# Patient Record
Sex: FEMALE | Race: WHITE | NOT HISPANIC OR LATINO | Employment: STUDENT | URBAN - METROPOLITAN AREA
[De-identification: names, ages, dates, MRNs, and addresses within clinical notes are randomized per-mention and may not be internally consistent; named-entity substitution may affect disease eponyms.]

---
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
## 2023-01-13 ENCOUNTER — OFFICE VISIT (OUTPATIENT)
Dept: CARDIOLOGY | Facility: CLINIC | Age: 18
End: 2023-01-13
Payer: COMMERCIAL

## 2023-01-13 VITALS
WEIGHT: 140 LBS | SYSTOLIC BLOOD PRESSURE: 110 MMHG | DIASTOLIC BLOOD PRESSURE: 86 MMHG | BODY MASS INDEX: 20.04 KG/M2 | HEART RATE: 62 BPM | HEIGHT: 70 IN

## 2023-01-13 DIAGNOSIS — R00.2 PALPITATIONS: Primary | ICD-10-CM

## 2023-01-13 PROCEDURE — 93000 ELECTROCARDIOGRAM COMPLETE: CPT | Performed by: NURSE PRACTITIONER

## 2023-01-13 PROCEDURE — 99214 OFFICE O/P EST MOD 30 MIN: CPT | Performed by: NURSE PRACTITIONER

## 2023-01-13 RX ORDER — RIBOFLAVIN (VITAMIN B2) 100 MG
TABLET ORAL
COMMUNITY

## 2023-01-13 NOTE — PROGRESS NOTES
Subjective:     Encounter Date:01/13/2023      Patient ID: Caitlyn Coyle is a 17 y.o. female.    Chief Complaint:follow up palpitations  History of Present Illness  This is a 18 y/o female who has been evaluated by Dr. Tapia in the past and is new to me today. She has a pmhx of palpitations and was initially seen in the office in August 2021. At the time, the palpitations were not occurring frequently enough to warrant placing an outpatient monitor. She was encouraged to refrain from energy drinks, pre-workout supplements and stop taking Aldactone which was prescribed for acne. She then saw Vero Huertas in January 2022 in follow up. Per office note, she was still having palpitations every now and then. No changes were made and she was instructed to follow up in 1 year.    She is here today for a follow up visit. Her palpitations are occurring more frequently. They occur at least once a week. They can last 30 minutes to an hour or two.  Per her Apple Watch, she has episodes of what looks like sinus tachycardia with rates in 150s-160s. She becomes dizzy when her heart rate starts to go back down. She does work out but doesn't do cardio such as running. She typically lifts weights. She does play [employer] and flag football. Last week while playing [employer], her Works out but doesn't do a lot of cardio. Last week playing racquetball, her heart rate spiked and remained elevated for about an hour. She says the other day she bent over and when she came back up, her heart rate went up and she had a little SOA with it. She denies any chest pain or syncope. She denies any tingling in her lips or fingertips when her heart is racing. She denies anxiety. She has pretty much cut out all caffeine. She recently saw her PCP who ordered lab work which included a normal CMP, Vit B12, Mag level, thyroid panel and CBC. PCP also ordered autoimmune testing which was reportedly normal as well.    I have reviewed and updated  as appropriate allergies, current medications, past family history, past medical history, past surgical history and problem list.    Review of Systems   Constitutional: Negative for fever, malaise/fatigue, weight gain and weight loss.   HENT: Negative for congestion, hoarse voice and sore throat.    Eyes: Negative for blurred vision and double vision.   Cardiovascular: Positive for palpitations. Negative for chest pain, dyspnea on exertion, leg swelling, orthopnea and syncope.   Respiratory: Negative for cough, shortness of breath and wheezing.    Gastrointestinal: Negative for abdominal pain, hematemesis, hematochezia and melena.   Genitourinary: Negative for dysuria and hematuria.   Neurological: Positive for dizziness. Negative for headaches, light-headedness and numbness.   Psychiatric/Behavioral: Negative for depression. The patient is not nervous/anxious.          Current Outpatient Medications:   •  ascorbic acid (VITAMIN C) 100 MG tablet, Take  by mouth., Disp: , Rfl:   •  cholecalciferol (VITAMIN D3) 25 MCG (1000 UT) tablet, Take 1,000 Units by mouth Daily., Disp: , Rfl:   •  Omega-3 Fatty Acids (fish oil) 1000 MG capsule capsule, Take  by mouth., Disp: , Rfl:   •  Zinc Sulfate 66 MG tablet, Take  by mouth., Disp: , Rfl:   •  montelukast (SINGULAIR) 10 MG tablet, Take 10 mg by mouth Daily., Disp: , Rfl:     Past Medical History:   Diagnosis Date   • Acne    • Palpitations        Past Surgical History:   Procedure Laterality Date   • WISDOM TOOTH EXTRACTION         No family history on file.    Social History     Tobacco Use   • Smoking status: Never   • Smokeless tobacco: Never   • Tobacco comments:     caffeine use- energy drink, pre work out   Substance Use Topics   • Alcohol use: Not Currently   • Drug use: Never         ECG 12 Lead    Date/Time: 1/13/2023 3:54 PM  Performed by: Abida Paige APRN  Authorized by: Abida Paige APRN   Comparison: compared with previous ECG from 1/12/2022  Similar to  "previous ECG  Rhythm: sinus rhythm               Objective:     Visit Vitals  /86 (BP Location: Right arm, Patient Position: Sitting, Cuff Size: Adult)   Pulse 62   Ht 177.8 cm (70\")   Wt 63.5 kg (140 lb)   BMI 20.09 kg/m²             Physical Exam  Constitutional:       Appearance: Normal appearance. She is normal weight.   HENT:      Head: Normocephalic.   Neck:      Vascular: No carotid bruit.   Cardiovascular:      Rate and Rhythm: Normal rate and regular rhythm.      Chest Wall: PMI is not displaced.      Pulses: Normal pulses.           Radial pulses are 2+ on the right side and 2+ on the left side.        Posterior tibial pulses are 2+ on the right side and 2+ on the left side.      Heart sounds: Normal heart sounds. No murmur heard.    No friction rub. No gallop.   Pulmonary:      Effort: Pulmonary effort is normal.      Breath sounds: Normal breath sounds.   Abdominal:      General: Bowel sounds are normal. There is no distension.      Palpations: Abdomen is soft.   Musculoskeletal:      Right lower leg: No edema.      Left lower leg: No edema.   Skin:     General: Skin is warm and dry.      Capillary Refill: Capillary refill takes less than 2 seconds.   Neurological:      Mental Status: She is alert and oriented to person, place, and time.   Psychiatric:         Mood and Affect: Mood normal.         Behavior: Behavior normal.         Thought Content: Thought content normal.          Lab Review:         Cardiac Procedures:   1.     Assessment:         Diagnoses and all orders for this visit:    1. Palpitations (Primary)  -     Holter Monitor - 72 Hour Up To 15 Days; Future    Other orders  -     ECG 12 Lead            Plan:       1. Palpitations: reviewed Apple watch and it appears to be sinus tachycardia but hard to completely rule out PSVT. Since she is having weekly occurrences of palpitations, I think we should go ahead and place a 2 week monitor. I do not feel a stress test at this point is going " to provide much useful information. Will await results of monitor and determine if any further testing is required.  2. Possible PSVT    Thank you for allowing me to participate in this patient's care. Please call with any questions or concerns. Ms. Coyle will follow up with Dr. Tapia in 3 months.          Your medication list          Accurate as of January 13, 2023  3:57 PM. If you have any questions, ask your nurse or doctor.            CONTINUE taking these medications      Instructions Last Dose Given Next Dose Due   ascorbic acid 100 MG tablet  Commonly known as: VITAMIN C      Take  by mouth.       cholecalciferol 25 MCG (1000 UT) tablet  Commonly known as: VITAMIN D3      Take 1,000 Units by mouth Daily.       fish oil 1000 MG capsule capsule      Take  by mouth.       montelukast 10 MG tablet  Commonly known as: SINGULAIR      Take 10 mg by mouth Daily.       Zinc Sulfate 66 MG tablet      Take  by mouth.                Abida Paige, RICH  01/13/23  1:29 PM EST

## 2023-03-03 ENCOUNTER — TELEPHONE (OUTPATIENT)
Dept: CARDIOLOGY | Facility: CLINIC | Age: 18
End: 2023-03-03
Payer: COMMERCIAL

## 2023-03-03 RX ORDER — ATENOLOL 25 MG/1
25 TABLET ORAL DAILY PRN
Qty: 30 TABLET | Refills: 1 | Status: SHIPPED | OUTPATIENT
Start: 2023-03-03

## 2023-03-03 RX ORDER — ATENOLOL 25 MG/1
25 TABLET ORAL DAILY
Qty: 30 TABLET | Refills: 11 | Status: SHIPPED | OUTPATIENT
Start: 2023-03-03 | End: 2023-03-03

## 2023-03-03 NOTE — TELEPHONE ENCOUNTER
Discussed results of monitor with patient. After she turned the monitor in, she had an episode of palpitations lasting over 4 hours. I have sent a prescription for PRN beta blocker to be taken during her prolonged episodes of SVT. Patient verbalized understanding of when to take the medication.

## 2024-02-16 ENCOUNTER — OFFICE VISIT (OUTPATIENT)
Dept: CARDIOLOGY | Facility: CLINIC | Age: 19
End: 2024-02-16
Payer: COMMERCIAL

## 2024-02-16 VITALS
WEIGHT: 144 LBS | DIASTOLIC BLOOD PRESSURE: 68 MMHG | OXYGEN SATURATION: 98 % | HEART RATE: 58 BPM | SYSTOLIC BLOOD PRESSURE: 112 MMHG | HEIGHT: 70 IN | BODY MASS INDEX: 20.62 KG/M2 | RESPIRATION RATE: 18 BRPM

## 2024-02-16 DIAGNOSIS — I47.10 PSVT (PAROXYSMAL SUPRAVENTRICULAR TACHYCARDIA): Primary | ICD-10-CM

## 2024-02-16 PROCEDURE — 99214 OFFICE O/P EST MOD 30 MIN: CPT | Performed by: INTERNAL MEDICINE

## 2024-05-07 ENCOUNTER — OFFICE VISIT (OUTPATIENT)
Age: 19
End: 2024-05-07
Payer: COMMERCIAL

## 2024-05-07 VITALS
HEART RATE: 74 BPM | HEIGHT: 70 IN | DIASTOLIC BLOOD PRESSURE: 70 MMHG | SYSTOLIC BLOOD PRESSURE: 124 MMHG | BODY MASS INDEX: 20.62 KG/M2 | WEIGHT: 144 LBS

## 2024-05-07 DIAGNOSIS — I47.10 SUSTAINED SVT: Primary | ICD-10-CM

## 2024-05-07 PROCEDURE — 93000 ELECTROCARDIOGRAM COMPLETE: CPT | Performed by: INTERNAL MEDICINE

## 2024-05-07 PROCEDURE — 99213 OFFICE O/P EST LOW 20 MIN: CPT | Performed by: INTERNAL MEDICINE

## 2024-05-09 ENCOUNTER — PATIENT ROUNDING (BHMG ONLY) (OUTPATIENT)
Dept: CARDIOLOGY | Facility: CLINIC | Age: 19
End: 2024-05-09
Payer: COMMERCIAL

## 2024-05-16 ENCOUNTER — TELEPHONE (OUTPATIENT)
Dept: CARDIOLOGY | Facility: CLINIC | Age: 19
End: 2024-05-16

## 2024-05-16 DIAGNOSIS — I47.10 SUSTAINED SVT: Primary | ICD-10-CM

## 2024-05-16 DIAGNOSIS — I47.10 PSVT (PAROXYSMAL SUPRAVENTRICULAR TACHYCARDIA): ICD-10-CM

## 2024-05-16 NOTE — TELEPHONE ENCOUNTER
Pt called wanting to move forward with SVT ablation with KP. Please place the order so we can get scheduled. Thanks...Laure

## 2024-05-16 NOTE — TELEPHONE ENCOUNTER
Caller: Caitlyn Coyle    Relationship: Self    Best call back number:  [phone]      PATIENT WOULD LIKE TO GO AHEAD AND SCHEDULE THE ABLATION, PLEASE PUT THE ORDER IN AND REACH OUT TO THE PATIENT FOR SCHEDULING.

## 2024-05-23 PROBLEM — I47.10 PSVT (PAROXYSMAL SUPRAVENTRICULAR TACHYCARDIA): Status: ACTIVE | Noted: 2024-05-16

## 2024-06-26 ENCOUNTER — TRANSCRIBE ORDERS (OUTPATIENT)
Dept: CARDIOLOGY | Facility: CLINIC | Age: 19
End: 2024-06-26
Payer: COMMERCIAL

## 2024-06-26 DIAGNOSIS — Z13.6 SCREENING FOR ISCHEMIC HEART DISEASE: ICD-10-CM

## 2024-06-26 DIAGNOSIS — Z01.810 PRE-OPERATIVE CARDIOVASCULAR EXAMINATION: Primary | ICD-10-CM

## 2024-07-22 ENCOUNTER — LAB (OUTPATIENT)
Dept: LAB | Facility: HOSPITAL | Age: 19
End: 2024-07-22
Payer: COMMERCIAL

## 2024-07-22 DIAGNOSIS — Z01.810 PRE-OPERATIVE CARDIOVASCULAR EXAMINATION: ICD-10-CM

## 2024-07-22 DIAGNOSIS — Z13.6 SCREENING FOR ISCHEMIC HEART DISEASE: ICD-10-CM

## 2024-07-22 LAB
ANION GAP SERPL CALCULATED.3IONS-SCNC: 10 MMOL/L (ref 5–15)
BASOPHILS # BLD AUTO: 0.04 10*3/MM3 (ref 0–0.2)
BASOPHILS NFR BLD AUTO: 0.3 % (ref 0–1.5)
BUN SERPL-MCNC: 14 MG/DL (ref 6–20)
BUN/CREAT SERPL: 17.9 (ref 7–25)
CALCIUM SPEC-SCNC: 9.2 MG/DL (ref 8.6–10.5)
CHLORIDE SERPL-SCNC: 100 MMOL/L (ref 98–107)
CO2 SERPL-SCNC: 26 MMOL/L (ref 22–29)
CREAT SERPL-MCNC: 0.78 MG/DL (ref 0.57–1)
DEPRECATED RDW RBC AUTO: 37.9 FL (ref 37–54)
EGFRCR SERPLBLD CKD-EPI 2021: 111 ML/MIN/1.73
EOSINOPHIL # BLD AUTO: 0.24 10*3/MM3 (ref 0–0.4)
EOSINOPHIL NFR BLD AUTO: 2 % (ref 0.3–6.2)
ERYTHROCYTE [DISTWIDTH] IN BLOOD BY AUTOMATED COUNT: 11.6 % (ref 12.3–15.4)
GLUCOSE SERPL-MCNC: 78 MG/DL (ref 65–99)
HCT VFR BLD AUTO: 39.8 % (ref 34–46.6)
HGB BLD-MCNC: 13.5 G/DL (ref 12–15.9)
IMM GRANULOCYTES # BLD AUTO: 0.03 10*3/MM3 (ref 0–0.05)
IMM GRANULOCYTES NFR BLD AUTO: 0.2 % (ref 0–0.5)
LYMPHOCYTES # BLD AUTO: 2.08 10*3/MM3 (ref 0.7–3.1)
LYMPHOCYTES NFR BLD AUTO: 17.1 % (ref 19.6–45.3)
MCH RBC QN AUTO: 30.5 PG (ref 26.6–33)
MCHC RBC AUTO-ENTMCNC: 33.9 G/DL (ref 31.5–35.7)
MCV RBC AUTO: 89.8 FL (ref 79–97)
MONOCYTES # BLD AUTO: 0.74 10*3/MM3 (ref 0.1–0.9)
MONOCYTES NFR BLD AUTO: 6.1 % (ref 5–12)
NEUTROPHILS NFR BLD AUTO: 74.3 % (ref 42.7–76)
NEUTROPHILS NFR BLD AUTO: 9.04 10*3/MM3 (ref 1.7–7)
NRBC BLD AUTO-RTO: 0 /100 WBC (ref 0–0.2)
PLATELET # BLD AUTO: 296 10*3/MM3 (ref 140–450)
PMV BLD AUTO: 9.3 FL (ref 6–12)
POTASSIUM SERPL-SCNC: 4.2 MMOL/L (ref 3.5–5.2)
RBC # BLD AUTO: 4.43 10*6/MM3 (ref 3.77–5.28)
SODIUM SERPL-SCNC: 136 MMOL/L (ref 136–145)
WBC NRBC COR # BLD AUTO: 12.17 10*3/MM3 (ref 3.4–10.8)

## 2024-07-22 PROCEDURE — 85025 COMPLETE CBC W/AUTO DIFF WBC: CPT

## 2024-07-22 PROCEDURE — 80048 BASIC METABOLIC PNL TOTAL CA: CPT

## 2024-07-22 PROCEDURE — 36415 COLL VENOUS BLD VENIPUNCTURE: CPT

## 2024-08-05 ENCOUNTER — HOSPITAL ENCOUNTER (OUTPATIENT)
Facility: HOSPITAL | Age: 19
Setting detail: HOSPITAL OUTPATIENT SURGERY
Discharge: HOME | End: 2024-08-05
Attending: INTERNAL MEDICINE | Admitting: INTERNAL MEDICINE
Payer: COMMERCIAL

## 2024-08-05 VITALS
TEMPERATURE: 98.6 F | HEART RATE: 81 BPM | HEIGHT: 67 IN | OXYGEN SATURATION: 100 % | BODY MASS INDEX: 22.76 KG/M2 | RESPIRATION RATE: 15 BRPM | SYSTOLIC BLOOD PRESSURE: 118 MMHG | WEIGHT: 145 LBS | DIASTOLIC BLOOD PRESSURE: 75 MMHG

## 2024-08-05 DIAGNOSIS — I47.10 SUSTAINED SVT: ICD-10-CM

## 2024-08-05 DIAGNOSIS — I47.10 PSVT (PAROXYSMAL SUPRAVENTRICULAR TACHYCARDIA): ICD-10-CM

## 2024-08-05 LAB
ACT BLD: 226 SECONDS (ref 82–152)
ACT BLD: 281 SECONDS (ref 82–152)
B-HCG UR QL: NEGATIVE
EXPIRATION DATE: NORMAL
INTERNAL NEGATIVE CONTROL: NEGATIVE
INTERNAL POSITIVE CONTROL: POSITIVE
Lab: NORMAL
QT INTERVAL: 393 MS
QT INTERVAL: 404 MS
QTC INTERVAL: 406 MS
QTC INTERVAL: 427 MS

## 2024-08-05 PROCEDURE — 25010000002 HEPARIN (PORCINE) PER 1000 UNITS: Performed by: INTERNAL MEDICINE

## 2024-08-05 PROCEDURE — C1730 CATH, EP, 19 OR FEW ELECT: HCPCS | Performed by: INTERNAL MEDICINE

## 2024-08-05 PROCEDURE — C1732 CATH, EP, DIAG/ABL, 3D/VECT: HCPCS | Performed by: INTERNAL MEDICINE

## 2024-08-05 PROCEDURE — 25010000002 PROTAMINE SULFATE PER 10 MG: Performed by: INTERNAL MEDICINE

## 2024-08-05 PROCEDURE — 25010000002 FENTANYL CITRATE (PF) 50 MCG/ML SOLUTION: Performed by: INTERNAL MEDICINE

## 2024-08-05 PROCEDURE — C1894 INTRO/SHEATH, NON-LASER: HCPCS | Performed by: INTERNAL MEDICINE

## 2024-08-05 PROCEDURE — 93005 ELECTROCARDIOGRAM TRACING: CPT | Performed by: INTERNAL MEDICINE

## 2024-08-05 PROCEDURE — S0260 H&P FOR SURGERY: HCPCS | Performed by: INTERNAL MEDICINE

## 2024-08-05 PROCEDURE — C1759 CATH, INTRA ECHOCARDIOGRAPHY: HCPCS | Performed by: INTERNAL MEDICINE

## 2024-08-05 PROCEDURE — 81025 URINE PREGNANCY TEST: CPT | Performed by: INTERNAL MEDICINE

## 2024-08-05 PROCEDURE — 25810000003 SODIUM CHLORIDE 0.9 % SOLUTION: Performed by: INTERNAL MEDICINE

## 2024-08-05 PROCEDURE — 93010 ELECTROCARDIOGRAM REPORT: CPT | Performed by: INTERNAL MEDICINE

## 2024-08-05 PROCEDURE — 93653 COMPRE EP EVAL TX SVT: CPT | Performed by: INTERNAL MEDICINE

## 2024-08-05 PROCEDURE — 93662 INTRACARDIAC ECG (ICE): CPT | Performed by: INTERNAL MEDICINE

## 2024-08-05 PROCEDURE — C1760 CLOSURE DEV, VASC: HCPCS | Performed by: INTERNAL MEDICINE

## 2024-08-05 PROCEDURE — 25010000002 MIDAZOLAM PER 1 MG: Performed by: INTERNAL MEDICINE

## 2024-08-05 PROCEDURE — C1893 INTRO/SHEATH, FIXED,NON-PEEL: HCPCS | Performed by: INTERNAL MEDICINE

## 2024-08-05 PROCEDURE — C1766 INTRO/SHEATH,STRBLE,NON-PEEL: HCPCS | Performed by: INTERNAL MEDICINE

## 2024-08-05 PROCEDURE — 93462 L HRT CATH TRNSPTL PUNCTURE: CPT | Performed by: INTERNAL MEDICINE

## 2024-08-05 PROCEDURE — 85347 COAGULATION TIME ACTIVATED: CPT

## 2024-08-05 RX ORDER — PROTAMINE SULFATE 10 MG/ML
INJECTION, SOLUTION INTRAVENOUS
Status: DISCONTINUED | OUTPATIENT
Start: 2024-08-05 | End: 2024-08-05 | Stop reason: HOSPADM

## 2024-08-05 RX ORDER — NALOXONE HCL 0.4 MG/ML
0.4 VIAL (ML) INJECTION
Status: DISCONTINUED | OUTPATIENT
Start: 2024-08-05 | End: 2024-08-05 | Stop reason: HOSPADM

## 2024-08-05 RX ORDER — SODIUM CHLORIDE 0.9 % (FLUSH) 0.9 %
10 SYRINGE (ML) INJECTION EVERY 12 HOURS SCHEDULED
Status: DISCONTINUED | OUTPATIENT
Start: 2024-08-05 | End: 2024-08-05 | Stop reason: HOSPADM

## 2024-08-05 RX ORDER — METHOHEXITAL IN WATER/PF 100MG/10ML
SYRINGE (ML) INTRAVENOUS
Status: DISCONTINUED | OUTPATIENT
Start: 2024-08-05 | End: 2024-08-05 | Stop reason: HOSPADM

## 2024-08-05 RX ORDER — LEVOCETIRIZINE DIHYDROCHLORIDE 5 MG/1
5 TABLET, FILM COATED ORAL EVERY EVENING
COMMUNITY

## 2024-08-05 RX ORDER — SODIUM CHLORIDE 9 MG/ML
INJECTION, SOLUTION INTRAVENOUS
Status: DISCONTINUED | OUTPATIENT
Start: 2024-08-05 | End: 2024-08-05 | Stop reason: HOSPADM

## 2024-08-05 RX ORDER — SODIUM CHLORIDE 0.9 % (FLUSH) 0.9 %
10 SYRINGE (ML) INJECTION AS NEEDED
Status: DISCONTINUED | OUTPATIENT
Start: 2024-08-05 | End: 2024-08-05 | Stop reason: HOSPADM

## 2024-08-05 RX ORDER — SPIRONOLACTONE 50 MG/1
50 TABLET, FILM COATED ORAL DAILY
COMMUNITY

## 2024-08-05 RX ORDER — HYDROMORPHONE HYDROCHLORIDE 1 MG/ML
0.5 INJECTION, SOLUTION INTRAMUSCULAR; INTRAVENOUS; SUBCUTANEOUS
Status: DISCONTINUED | OUTPATIENT
Start: 2024-08-05 | End: 2024-08-05 | Stop reason: HOSPADM

## 2024-08-05 RX ORDER — ACETAMINOPHEN 325 MG/1
650 TABLET ORAL EVERY 4 HOURS PRN
Status: DISCONTINUED | OUTPATIENT
Start: 2024-08-05 | End: 2024-08-05 | Stop reason: HOSPADM

## 2024-08-05 RX ORDER — SODIUM CHLORIDE 9 MG/ML
40 INJECTION, SOLUTION INTRAVENOUS AS NEEDED
Status: DISCONTINUED | OUTPATIENT
Start: 2024-08-05 | End: 2024-08-05 | Stop reason: HOSPADM

## 2024-08-05 RX ORDER — MIDAZOLAM HYDROCHLORIDE 1 MG/ML
INJECTION INTRAMUSCULAR; INTRAVENOUS
Status: DISCONTINUED | OUTPATIENT
Start: 2024-08-05 | End: 2024-08-05 | Stop reason: HOSPADM

## 2024-08-05 RX ORDER — HEPARIN SODIUM 1000 [USP'U]/ML
INJECTION, SOLUTION INTRAVENOUS; SUBCUTANEOUS
Status: DISCONTINUED | OUTPATIENT
Start: 2024-08-05 | End: 2024-08-05 | Stop reason: HOSPADM

## 2024-08-05 RX ORDER — FENTANYL CITRATE 50 UG/ML
INJECTION, SOLUTION INTRAMUSCULAR; INTRAVENOUS
Status: DISCONTINUED | OUTPATIENT
Start: 2024-08-05 | End: 2024-08-05 | Stop reason: HOSPADM

## 2024-08-05 RX ORDER — DROSPIRENONE, ETHINYL ESTRADIOL AND LEVOMEFOLATE CALCIUM AND LEVOMEFOLATE CALCIUM 3-0.02(24)
KIT ORAL
COMMUNITY

## 2024-08-05 RX ORDER — SODIUM CHLORIDE 9 MG/ML
75 INJECTION, SOLUTION INTRAVENOUS CONTINUOUS
Status: DISCONTINUED | OUTPATIENT
Start: 2024-08-05 | End: 2024-08-05 | Stop reason: HOSPADM

## 2024-08-05 RX ORDER — NITROGLYCERIN 0.4 MG/1
0.4 TABLET SUBLINGUAL
Status: DISCONTINUED | OUTPATIENT
Start: 2024-08-05 | End: 2024-08-05 | Stop reason: HOSPADM

## 2024-08-05 RX ORDER — ACETAMINOPHEN 650 MG/1
650 SUPPOSITORY RECTAL EVERY 4 HOURS PRN
Status: DISCONTINUED | OUTPATIENT
Start: 2024-08-05 | End: 2024-08-05 | Stop reason: HOSPADM

## 2024-08-05 RX ORDER — ONDANSETRON 2 MG/ML
4 INJECTION INTRAMUSCULAR; INTRAVENOUS EVERY 6 HOURS PRN
Status: DISCONTINUED | OUTPATIENT
Start: 2024-08-05 | End: 2024-08-05 | Stop reason: HOSPADM

## 2024-08-05 RX ADMIN — SODIUM CHLORIDE 75 ML/HR: 9 INJECTION, SOLUTION INTRAVENOUS at 12:45

## 2024-08-05 NOTE — Clinical Note
Discussed condition and exacerbating conditions/situations (e.g., dry/arid environments, overhead fans, air conditioners, side effect of medications). Number of grounding pads used: 1  Location of grounding pads: L flank

## 2024-08-05 NOTE — Clinical Note
Hemostasis started on the left femoral vein. Vascade MVP was used in achieving hemostasis. Closure device deployed in the vessel and manual pressure applied to vessel. Manual pressure was held by Lj. Manual pressure was held for 10 min. Hemostasis achieved successfully.

## 2024-08-05 NOTE — Clinical Note
Hemostasis started on the right femoral vein. Vascade MVP was used in achieving hemostasis. Closure device deployed in the vessel and manual pressure applied to vessel. Manual pressure was held by Lj GRIDER. Manual pressure was held for 10 min. Hemostasis achieved successfully.

## 2024-08-05 NOTE — Clinical Note
Hemostasis started on the right femoral vein. Vascade MVP was used in achieving hemostasis. Closure device deployed in the vessel and manual pressure applied to vessel. Manual pressure was held by Lj. Manual pressure was held for 10 min. Hemostasis achieved successfully.

## 2024-08-05 NOTE — DISCHARGE INSTRUCTIONS
[facility]  Cardiology  [address] [place] [address]  [phone]    Coronary Ablation After Care    Refer to this sheet in the next few weeks. These instructions provide you with information on caring for yourself after your procedure. Your health care provider may also give you more specific instructions. Your treatment has been planned according to current medical practices, but problems sometimes occur. Call your health care provider if you have any problems or questions after your procedure.      What to Expect After the Procedure:  After your procedure, it is typical to have the following sensations:  Minor discomfort or tenderness and a small bump at the catheter insertion site(s). The bump(s) should usually decrease in size and tenderness within 1 to 2 weeks.  Any bruising will usually fade within 2 to 4 weeks.  Home Care Instructions:  Do not apply powder or lotion to the site.  Do not take baths, swim, or use a hot tub until your health care provider approves and the site is completely healed.  Do not bend, squat, or lift anything over 20 lb (9 kg) or as directed by your health care provider. However, we recommend lifting nothing heavier than a gallon of milk.    You may shower 24 hours after the procedure. Remove the bandage (dressing) and gently wash the site with plain soap and water. Gently pat the site dry. You may apply a band aid daily for 2 days if desired.    Inspect the site at least twice daily.  Increase your fluid intake for the next 2 days.    Limit your activity for the first 48 hours.   Avoid strenuous activity for 1 week or as advised by your physician.    Follow instructions about when you can drive or return to work as directed by your physician.    Hold direct pressure over the site when you cough, sneeze, laugh or change positions.  Do this for the next 2 days.    Call Your Doctor If:  You have drainage (other than a small amount of blood on the dressing).  You  have chills or a fever > 101.  You have redness, warmth, swelling (larger than a walnut), or pain at the insertion   You develop palpitations, chest pain or shortness of breath, feel faint, or pass out.  You develop pain, discoloration, coldness, numbness, tingling, or severe bruising in the leg that held the catheter.  You develop bleeding from any other place, such as the bowels.  You have heavy bleeding from the site.  If this happens, hold pressure on the site and call 911.        Make Sure You:  Understand these instructions.  Will watch your condition.  Will get help right away if you are not doing well or get worse.

## 2024-08-06 NOTE — H&P
[facility]   HISTORY AND PHYSICAL    Patient Name:Caitlyn Coyle  : 2005  MRN: [id number]  Primary Care Physician: Provider, No Known  Date of admission: 2024    Subjective   Subjective     Chief Complaint: SVT    History of Present Illness   Caitlyn Coyle is a 19 y.o. female who presents today for SVT.      She began experiencing episodes of sudden onset tachycardia, since she was a senior in highschool.      Often, episodes are brought out during physical exertion, but they have occurred at rest.      They have lasted up to 2-3 hours.      She wore a monitor, 1.5 hr long episode of SVT    Review of Systems   Constitutional:  Negative for activity change and fatigue.   Eyes: Negative.    Respiratory:  Negative for chest tightness and shortness of breath.    Cardiovascular:  Positive for palpitations. Negative for chest pain and leg swelling.   Gastrointestinal: Negative.    Endocrine: Negative.    Genitourinary: Negative.    Musculoskeletal: Negative.    Skin: Negative.    Neurological:  Negative for dizziness and syncope.   Hematological: Negative.    Psychiatric/Behavioral: Negative.           Personal History     Past Medical History:   Diagnosis Date    Acne     Palpitations        Past Surgical History:   Procedure Laterality Date    CARDIAC ELECTROPHYSIOLOGY PROCEDURE N/A 2024    Procedure: Ablation SVT;  Surgeon: Adam Coelho MD;  Location: [facility] LOCATION;  Service: Cardiovascular;  Laterality: N/A;    WISDOM TOOTH EXTRACTION         Family History: Her family history is not on file.     Social History: She  reports that she has never smoked. She has never used smokeless tobacco. She reports current alcohol use. She reports that she does not use drugs.    Home Medications:  Drospiren-Eth Estrad-Levomefol, levocetirizine, and spironolactone    Allergies:  She is allergic to doxycycline.    Objective    Objective     Vitals:    Temp:  [98.6 °F (37 °C)] 98.6 °F  (37 °C)  Heart Rate:  [65-81] 81  Resp:  [10-23] 15  BP: (108-119)/(64-95) 118/75    Physical Exam  Vitals and nursing note reviewed.   Constitutional:       General: She is not in acute distress.     Appearance: She is not diaphoretic.   HENT:      Mouth/Throat:      Pharynx: No oropharyngeal exudate.   Eyes:      General: No scleral icterus.  Neck:      Trachea: No tracheal deviation.   Cardiovascular:      Rate and Rhythm: Normal rate and regular rhythm.   Pulmonary:      Effort: Pulmonary effort is normal. No respiratory distress.      Breath sounds: Normal breath sounds.   Abdominal:      General: There is no distension.      Palpations: Abdomen is soft.   Skin:     General: Skin is warm and dry.   Neurological:      Mental Status: She is alert and oriented to person, place, and time.   Psychiatric:         Behavior: Behavior normal.         Thought Content: Thought content normal.         Judgment: Judgment normal.          Result Review    Result Review:  I have personally reviewed the results from the time of this admission to 8/6/2024 09:58 EDT and agree with these findings:  []  Laboratory list / accordion  []  Microbiology  []  Radiology  []  EKG/Telemetry   []  Cardiology/Vascular   []  Pathology  []  Old records  []  Other:  Most notable findings include: sinus rhythm      Assessment & Plan   Assessment / Plan     Brief Patient Summary:  Caitlyn Coyle is a 19 y.o. female with recurrent symptomatic SVT    Active Hospital Problems:  Active Hospital Problems    Diagnosis     **PSVT (paroxysmal supraventricular tachycardia)     Sustained SVT      Plan:   Proceed with ablation    Adam Coelho MD

## 2024-08-07 ENCOUNTER — TELEPHONE (OUTPATIENT)
Dept: CARDIOLOGY | Facility: CLINIC | Age: 19
End: 2024-08-07

## 2024-08-07 NOTE — TELEPHONE ENCOUNTER
Caller: Maia Coyle    Relationship to patient: Mother    Best call back number: [phone]      Type of visit: HOSPITAL FU    Requested date: BY SEP 05TH    If rescheduling, when is the original appointment:     Additional notes:PATIENT NEEDS HOSPITAL FU ONE MONTH FROM DISCHARGE AFTER ABLATION WITH SALIMA LUNA OR DR GILMORE. HUB UNABLE TO SCHEDULE WITHIN TIME FRAME NO AVAILABILITY. PATIENT'S MOTHER STATES SHE NEEDS A FRIDAY, PATIENT GOES OUT OF TOWN FOR SCHOOL. PLEASE ADVISE.

## 2025-01-03 ENCOUNTER — OFFICE VISIT (OUTPATIENT)
Dept: CARDIOLOGY | Facility: CLINIC | Age: 20
End: 2025-01-03
Payer: COMMERCIAL

## 2025-01-03 VITALS
WEIGHT: 145 LBS | DIASTOLIC BLOOD PRESSURE: 76 MMHG | HEIGHT: 67 IN | BODY MASS INDEX: 22.76 KG/M2 | OXYGEN SATURATION: 98 % | HEART RATE: 65 BPM | RESPIRATION RATE: 18 BRPM | SYSTOLIC BLOOD PRESSURE: 116 MMHG

## 2025-01-03 DIAGNOSIS — I47.10 PSVT (PAROXYSMAL SUPRAVENTRICULAR TACHYCARDIA): Primary | ICD-10-CM

## 2025-01-03 PROCEDURE — 99213 OFFICE O/P EST LOW 20 MIN: CPT | Performed by: INTERNAL MEDICINE

## 2025-01-03 RX ORDER — TRIAMCINOLONE ACETONIDE 1 MG/G
1 CREAM TOPICAL SEE ADMIN INSTRUCTIONS
COMMUNITY
Start: 2024-10-02

## 2025-01-03 RX ORDER — DROSPIRENONE AND ETHINYL ESTRADIOL 0.02-3(28)
1 KIT ORAL DAILY
COMMUNITY
Start: 2024-05-28

## 2025-01-03 RX ORDER — CHLORAL HYDRATE 500 MG
CAPSULE ORAL
COMMUNITY

## 2025-01-03 RX ORDER — ISOTRETINOIN 40 MG/1
40 CAPSULE ORAL 2 TIMES DAILY WITH MEALS
COMMUNITY
Start: 2024-12-10

## 2025-01-03 NOTE — PROGRESS NOTES
[facility]      Patient Name: Caitlyn Coyle  :2005  Age: 19 y.o.  Encounter Provider:  Darron Tapia Jr, MD      Chief Complaint: Follow-up PSVT        HPI  16-year-old female with past medical history of acne currently on high-dose spironolactone who presents for initial evaluation of palpitations.  She has noted palpitations on and off for the last 8 months.  She states usually they are brief lasting no more than seconds to minutes.  She had an intense episode after playing sports for a few hours in early April for which she was seen in the ER.  She states that for about 2 hours per apple watch was telling her that her heart rate was ranging from 140 to 180 bpm.  She is unfamiliar with the functionality and cannot get a tracing of this but shows me the heart rate trend for that day.  She states that by the time she came to the ER her heart rate was down to 80 bpm.  She notes that she usually takes a high caffeine supplement prior to working out.  She works out in the gym 4 to 5 days/week.  She notes occasional dizziness after working out but has never passed out.  She has not taken the supplement since the ER visit and has not had any further symptoms.  There is family history of being told of a murmur in childhood.  She is a non-smoker who denies alcohol or illicit drug use.  She is currently on high-dose spironolactone for acne which was started around the same time as the symptoms.    We were able to see the arrhythmia on ambulatory telemetry monitor after last visit with RICH Paige.  She had episodes of SVT that lasted up to an hour.  Fastest heart rate was 207 bpm.  She was placed on atenolol as needed.  She is only used a few doses but she is having symptoms 3-4 times per week.  She has had it last up to 3 hours.  She got dizzy with her last episode.  No episodes of syncope.    She met with Dr. Espinosa after our last visit and ultimately decided to move forward with  "ablation.  Since ablation in August she is doing extremely well.  She is back to regular exercise with no limiting symptoms.  She has not had any sustained palpitations since the procedure.    The following portions of the patient's history were reviewed and updated as appropriate: allergies, current medications, past family history, past medical history, past social history, past surgical history and problem list.      Review of Systems   Constitutional: Negative for chills and fever.   HENT:  Negative for hoarse voice and sore throat.    Eyes:  Negative for double vision and photophobia.   Cardiovascular:  Positive for palpitations. Negative for chest pain, leg swelling, near-syncope, orthopnea, paroxysmal nocturnal dyspnea and syncope.   Respiratory:  Negative for cough and wheezing.    Skin:  Negative for poor wound healing and rash.   Musculoskeletal:  Negative for arthritis and joint swelling.   Gastrointestinal:  Negative for bloating, abdominal pain, hematemesis and hematochezia.   Neurological:  Positive for dizziness. Negative for focal weakness.   Psychiatric/Behavioral:  Negative for depression and suicidal ideas.      ROS was reviewed, updated and amended when necessary.    OBJECTIVE:   Vital Signs  Vitals:    01/03/25 1235   BP: 116/76   Pulse: 65   Resp: 18   SpO2: 98%     Estimated body mass index is 22.71 kg/m² as calculated from the following:    Height as of this encounter: 170.2 cm (67\").    Weight as of this encounter: 65.8 kg (145 lb).    Vitals reviewed.   Constitutional:       Appearance: Healthy appearance. Not in distress.   Neck:      Vascular: No JVR. JVD normal.   Pulmonary:      Effort: Pulmonary effort is normal.      Breath sounds: Normal breath sounds. No wheezing. No rhonchi. No rales.   Chest:      Chest wall: Not tender to palpatation.   Abdominal:      General: Bowel sounds are normal.      Palpations: Abdomen is soft.      Tenderness: There is no abdominal tenderness. "   Musculoskeletal: Normal range of motion.         General: No tenderness. Skin:     General: Skin is warm and dry.   Neurological:      General: No focal deficit present.      Mental Status: Alert and oriented to person, place and time.     Physical exam was reviewed, updated and amended when necessary.    Procedures          ASSESSMENT:     Palpitations   childhood history of murmur  Acne    PLAN OF CARE:     PSVT -status post ablation doing well.  Continue to increase activity as tolerated.    Return to clinic 12 months             Discharge Medications            Accurate as of January 3, 2025 12:45 PM. If you have any questions, ask your nurse or doctor.                Continue These Medications        Instructions Start Date   Amnesteem 40 MG capsule  Generic drug: ISOtretinoin   40 mg, 2 Times Daily With Meals      drospirenone-ethinyl estradiol 3-0.02 MG per tablet  Commonly known as: RIVER,GIANVI   1 tablet, Daily      fish oil 1000 MG capsule capsule   Daily With Breakfast      levocetirizine 5 MG tablet  Commonly known as: XYZAL   5 mg, Every Evening      spironolactone 50 MG tablet  Commonly known as: ALDACTONE   50 mg, Daily      triamcinolone 0.1 % cream  Commonly known as: KENALOG   1 Application, See Admin Instructions               Thank you for allowing me to participate in the care of your patient,      Sincerely,   Darron Tapia MD  [facility]  01/03/25  12:45 EST

## (undated) DEVICE — SOUNDSTAR ECO 8F G CATHETER: Brand: SOUNDSTAR

## (undated) DEVICE — Device: Brand: DECANAV

## (undated) DEVICE — Device

## (undated) DEVICE — Device: Brand: VIZIGO

## (undated) DEVICE — CATH EP SUPRM QUADPOLAR CRD2 6F 5MM 120CM

## (undated) DEVICE — PINNACLE INTRODUCER SHEATH: Brand: PINNACLE

## (undated) DEVICE — CLEARSIGHT FINGER CUFF MEDIUM MULTI PACK: Brand: CLEARSIGHT

## (undated) DEVICE — SYS CLS VASC/VENI VASCADE MVP 6TO12F

## (undated) DEVICE — Device: Brand: SMARTABLATE

## (undated) DEVICE — Device: Brand: WEBSTER

## (undated) DEVICE — Device: Brand: REFERENCE PATCH CARTO 3

## (undated) DEVICE — BI-DIRECTIONAL NAVIGATION CATHETER, NAV, D-F: Brand: QDOT MICRO

## (undated) DEVICE — LOU EP: Brand: MEDLINE INDUSTRIES, INC.

## (undated) DEVICE — 1 X VERSACROSS TRANSSEPTAL SHEATH (INCLUDING  1 X J-TIP GUIDEWIRE); 1 X VERSACROSS RF WIRE (INCLUDING 1 X CONNECTOR CABLE (SINGLE USE)); 1 X DISPERSIVE ELECTRODE: Brand: VERSACROSS ACCESS SOLUTION